# Patient Record
Sex: FEMALE | Race: BLACK OR AFRICAN AMERICAN | NOT HISPANIC OR LATINO | URBAN - METROPOLITAN AREA
[De-identification: names, ages, dates, MRNs, and addresses within clinical notes are randomized per-mention and may not be internally consistent; named-entity substitution may affect disease eponyms.]

---

## 2024-11-06 ENCOUNTER — APPOINTMENT (OUTPATIENT)
Dept: RADIOLOGY | Facility: HOSPITAL | Age: 64
End: 2024-11-06
Payer: COMMERCIAL

## 2024-11-06 ENCOUNTER — APPOINTMENT (OUTPATIENT)
Dept: CARDIOLOGY | Facility: HOSPITAL | Age: 64
End: 2024-11-06
Payer: COMMERCIAL

## 2024-11-06 ENCOUNTER — HOSPITAL ENCOUNTER (OUTPATIENT)
Facility: HOSPITAL | Age: 64
Setting detail: OBSERVATION
Discharge: HOME | End: 2024-11-08
Attending: STUDENT IN AN ORGANIZED HEALTH CARE EDUCATION/TRAINING PROGRAM | Admitting: INTERNAL MEDICINE
Payer: COMMERCIAL

## 2024-11-06 DIAGNOSIS — G45.9 TRANSIENT CEREBRAL ISCHEMIC ATTACK, UNSPECIFIED: ICD-10-CM

## 2024-11-06 DIAGNOSIS — R55 SYNCOPE AND COLLAPSE: ICD-10-CM

## 2024-11-06 DIAGNOSIS — H61.20 CERUMEN IN AUDITORY CANAL ON EXAMINATION: ICD-10-CM

## 2024-11-06 DIAGNOSIS — R42 LIGHTHEADEDNESS: Primary | ICD-10-CM

## 2024-11-06 LAB
ALBUMIN SERPL BCP-MCNC: 4 G/DL (ref 3.4–5)
ALP SERPL-CCNC: 81 U/L (ref 33–136)
ALT SERPL W P-5'-P-CCNC: 23 U/L (ref 7–45)
ANION GAP SERPL CALC-SCNC: 9 MMOL/L (ref 10–20)
APPEARANCE UR: CLEAR
AST SERPL W P-5'-P-CCNC: 30 U/L (ref 9–39)
BASOPHILS # BLD AUTO: 0.02 X10*3/UL (ref 0–0.1)
BASOPHILS NFR BLD AUTO: 0.3 %
BILIRUB SERPL-MCNC: 0.3 MG/DL (ref 0–1.2)
BILIRUB UR STRIP.AUTO-MCNC: NEGATIVE MG/DL
BNP SERPL-MCNC: 20 PG/ML (ref 0–99)
BUN SERPL-MCNC: 20 MG/DL (ref 6–23)
CALCIUM SERPL-MCNC: 9.1 MG/DL (ref 8.6–10.3)
CARDIAC TROPONIN I PNL SERPL HS: 4 NG/L (ref 0–13)
CHLORIDE SERPL-SCNC: 107 MMOL/L (ref 98–107)
CO2 SERPL-SCNC: 28 MMOL/L (ref 21–32)
COLOR UR: ABNORMAL
CREAT SERPL-MCNC: 0.65 MG/DL (ref 0.5–1.05)
EGFRCR SERPLBLD CKD-EPI 2021: >90 ML/MIN/1.73M*2
EOSINOPHIL # BLD AUTO: 0.18 X10*3/UL (ref 0–0.7)
EOSINOPHIL NFR BLD AUTO: 2.5 %
ERYTHROCYTE [DISTWIDTH] IN BLOOD BY AUTOMATED COUNT: 12.9 % (ref 11.5–14.5)
GLUCOSE SERPL-MCNC: 102 MG/DL (ref 74–99)
GLUCOSE UR STRIP.AUTO-MCNC: NORMAL MG/DL
HCT VFR BLD AUTO: 38.8 % (ref 36–46)
HGB BLD-MCNC: 12.7 G/DL (ref 12–16)
IMM GRANULOCYTES # BLD AUTO: 0.01 X10*3/UL (ref 0–0.7)
IMM GRANULOCYTES NFR BLD AUTO: 0.1 % (ref 0–0.9)
KETONES UR STRIP.AUTO-MCNC: NEGATIVE MG/DL
LEUKOCYTE ESTERASE UR QL STRIP.AUTO: NEGATIVE
LYMPHOCYTES # BLD AUTO: 2.63 X10*3/UL (ref 1.2–4.8)
LYMPHOCYTES NFR BLD AUTO: 36.3 %
MCH RBC QN AUTO: 29.3 PG (ref 26–34)
MCHC RBC AUTO-ENTMCNC: 32.7 G/DL (ref 32–36)
MCV RBC AUTO: 90 FL (ref 80–100)
MONOCYTES # BLD AUTO: 0.47 X10*3/UL (ref 0.1–1)
MONOCYTES NFR BLD AUTO: 6.5 %
MUCOUS THREADS #/AREA URNS AUTO: NORMAL /LPF
NEUTROPHILS # BLD AUTO: 3.94 X10*3/UL (ref 1.2–7.7)
NEUTROPHILS NFR BLD AUTO: 54.3 %
NITRITE UR QL STRIP.AUTO: NEGATIVE
NRBC BLD-RTO: 0 /100 WBCS (ref 0–0)
PH UR STRIP.AUTO: 6.5 [PH]
PLATELET # BLD AUTO: 295 X10*3/UL (ref 150–450)
POTASSIUM SERPL-SCNC: 4.5 MMOL/L (ref 3.5–5.3)
PROT SERPL-MCNC: 7.2 G/DL (ref 6.4–8.2)
PROT UR STRIP.AUTO-MCNC: ABNORMAL MG/DL
RBC # BLD AUTO: 4.33 X10*6/UL (ref 4–5.2)
RBC # UR STRIP.AUTO: NEGATIVE /UL
RBC #/AREA URNS AUTO: NORMAL /HPF
SARS-COV-2 RNA RESP QL NAA+PROBE: NOT DETECTED
SODIUM SERPL-SCNC: 139 MMOL/L (ref 136–145)
SP GR UR STRIP.AUTO: >1.05
SQUAMOUS #/AREA URNS AUTO: NORMAL /HPF
T4 FREE SERPL-MCNC: 0.82 NG/DL (ref 0.61–1.12)
TSH SERPL-ACNC: 5.09 MIU/L (ref 0.44–3.98)
UROBILINOGEN UR STRIP.AUTO-MCNC: NORMAL MG/DL
WBC # BLD AUTO: 7.3 X10*3/UL (ref 4.4–11.3)
WBC #/AREA URNS AUTO: NORMAL /HPF

## 2024-11-06 PROCEDURE — 85025 COMPLETE CBC W/AUTO DIFF WBC: CPT | Performed by: STUDENT IN AN ORGANIZED HEALTH CARE EDUCATION/TRAINING PROGRAM

## 2024-11-06 PROCEDURE — 2500000004 HC RX 250 GENERAL PHARMACY W/ HCPCS (ALT 636 FOR OP/ED): Performed by: STUDENT IN AN ORGANIZED HEALTH CARE EDUCATION/TRAINING PROGRAM

## 2024-11-06 PROCEDURE — 70498 CT ANGIOGRAPHY NECK: CPT | Performed by: STUDENT IN AN ORGANIZED HEALTH CARE EDUCATION/TRAINING PROGRAM

## 2024-11-06 PROCEDURE — 80053 COMPREHEN METABOLIC PANEL: CPT | Performed by: STUDENT IN AN ORGANIZED HEALTH CARE EDUCATION/TRAINING PROGRAM

## 2024-11-06 PROCEDURE — 83880 ASSAY OF NATRIURETIC PEPTIDE: CPT | Performed by: STUDENT IN AN ORGANIZED HEALTH CARE EDUCATION/TRAINING PROGRAM

## 2024-11-06 PROCEDURE — 70496 CT ANGIOGRAPHY HEAD: CPT | Performed by: STUDENT IN AN ORGANIZED HEALTH CARE EDUCATION/TRAINING PROGRAM

## 2024-11-06 PROCEDURE — 81001 URINALYSIS AUTO W/SCOPE: CPT | Performed by: STUDENT IN AN ORGANIZED HEALTH CARE EDUCATION/TRAINING PROGRAM

## 2024-11-06 PROCEDURE — 84484 ASSAY OF TROPONIN QUANT: CPT | Performed by: STUDENT IN AN ORGANIZED HEALTH CARE EDUCATION/TRAINING PROGRAM

## 2024-11-06 PROCEDURE — 99285 EMERGENCY DEPT VISIT HI MDM: CPT

## 2024-11-06 PROCEDURE — 87635 SARS-COV-2 COVID-19 AMP PRB: CPT | Performed by: STUDENT IN AN ORGANIZED HEALTH CARE EDUCATION/TRAINING PROGRAM

## 2024-11-06 PROCEDURE — 96374 THER/PROPH/DIAG INJ IV PUSH: CPT | Mod: 59

## 2024-11-06 PROCEDURE — 71045 X-RAY EXAM CHEST 1 VIEW: CPT

## 2024-11-06 PROCEDURE — 84443 ASSAY THYROID STIM HORMONE: CPT | Performed by: STUDENT IN AN ORGANIZED HEALTH CARE EDUCATION/TRAINING PROGRAM

## 2024-11-06 PROCEDURE — 36415 COLL VENOUS BLD VENIPUNCTURE: CPT | Performed by: STUDENT IN AN ORGANIZED HEALTH CARE EDUCATION/TRAINING PROGRAM

## 2024-11-06 PROCEDURE — 93005 ELECTROCARDIOGRAM TRACING: CPT

## 2024-11-06 PROCEDURE — 84439 ASSAY OF FREE THYROXINE: CPT | Performed by: STUDENT IN AN ORGANIZED HEALTH CARE EDUCATION/TRAINING PROGRAM

## 2024-11-06 PROCEDURE — 70498 CT ANGIOGRAPHY NECK: CPT

## 2024-11-06 PROCEDURE — 2550000001 HC RX 255 CONTRASTS: Performed by: STUDENT IN AN ORGANIZED HEALTH CARE EDUCATION/TRAINING PROGRAM

## 2024-11-06 PROCEDURE — 80061 LIPID PANEL: CPT | Performed by: NURSE PRACTITIONER

## 2024-11-06 PROCEDURE — 71045 X-RAY EXAM CHEST 1 VIEW: CPT | Performed by: RADIOLOGY

## 2024-11-06 PROCEDURE — 2500000001 HC RX 250 WO HCPCS SELF ADMINISTERED DRUGS (ALT 637 FOR MEDICARE OP): Performed by: STUDENT IN AN ORGANIZED HEALTH CARE EDUCATION/TRAINING PROGRAM

## 2024-11-06 PROCEDURE — 83036 HEMOGLOBIN GLYCOSYLATED A1C: CPT | Mod: AHULAB | Performed by: NURSE PRACTITIONER

## 2024-11-06 RX ORDER — KETOROLAC TROMETHAMINE 30 MG/ML
30 INJECTION, SOLUTION INTRAMUSCULAR; INTRAVENOUS ONCE
Status: COMPLETED | OUTPATIENT
Start: 2024-11-06 | End: 2024-11-06

## 2024-11-06 RX ORDER — OXYCODONE HYDROCHLORIDE 5 MG/1
5 TABLET ORAL ONCE
Status: COMPLETED | OUTPATIENT
Start: 2024-11-06 | End: 2024-11-06

## 2024-11-06 ASSESSMENT — PAIN SCALES - GENERAL
PAINLEVEL_OUTOF10: 9
PAINLEVEL_OUTOF10: 0 - NO PAIN
PAINLEVEL_OUTOF10: 8

## 2024-11-06 ASSESSMENT — COLUMBIA-SUICIDE SEVERITY RATING SCALE - C-SSRS
2. HAVE YOU ACTUALLY HAD ANY THOUGHTS OF KILLING YOURSELF?: NO
1. IN THE PAST MONTH, HAVE YOU WISHED YOU WERE DEAD OR WISHED YOU COULD GO TO SLEEP AND NOT WAKE UP?: NO
6. HAVE YOU EVER DONE ANYTHING, STARTED TO DO ANYTHING, OR PREPARED TO DO ANYTHING TO END YOUR LIFE?: NO

## 2024-11-06 ASSESSMENT — PAIN DESCRIPTION - LOCATION: LOCATION: HEAD

## 2024-11-06 ASSESSMENT — PAIN - FUNCTIONAL ASSESSMENT: PAIN_FUNCTIONAL_ASSESSMENT: 0-10

## 2024-11-07 ENCOUNTER — APPOINTMENT (OUTPATIENT)
Dept: VASCULAR MEDICINE | Facility: HOSPITAL | Age: 64
End: 2024-11-07
Payer: COMMERCIAL

## 2024-11-07 ENCOUNTER — APPOINTMENT (OUTPATIENT)
Dept: CARDIOLOGY | Facility: HOSPITAL | Age: 64
End: 2024-11-07
Payer: COMMERCIAL

## 2024-11-07 ENCOUNTER — APPOINTMENT (OUTPATIENT)
Dept: RADIOLOGY | Facility: HOSPITAL | Age: 64
End: 2024-11-07
Payer: COMMERCIAL

## 2024-11-07 PROBLEM — R42 LIGHTHEADEDNESS: Status: ACTIVE | Noted: 2024-11-07

## 2024-11-07 LAB
AORTIC VALVE MEAN GRADIENT: 5 MMHG
AORTIC VALVE PEAK VELOCITY: 1.55 M/S
ATRIAL RATE: 71 BPM
AV PEAK GRADIENT: 10 MMHG
AVA (PEAK VEL): 1.8 CM2
AVA (VTI): 1.91 CM2
CARDIAC TROPONIN I PNL SERPL HS: 5 NG/L (ref 0–13)
CHOLEST SERPL-MCNC: 181 MG/DL (ref 0–199)
CHOLESTEROL/HDL RATIO: 2.6
EJECTION FRACTION APICAL 4 CHAMBER: 52.7
EJECTION FRACTION: 61 %
EST. AVERAGE GLUCOSE BLD GHB EST-MCNC: 134 MG/DL
HBA1C MFR BLD: 6.3 %
HDLC SERPL-MCNC: 69.1 MG/DL
LDLC SERPL CALC-MCNC: 100 MG/DL
LEFT ATRIUM VOLUME AREA LENGTH INDEX BSA: 19 ML/M2
LEFT VENTRICLE INTERNAL DIMENSION DIASTOLE: 3.62 CM (ref 3.5–6)
LEFT VENTRICULAR OUTFLOW TRACT DIAMETER: 2.07 CM
MITRAL VALVE E/A RATIO: 0.76
NON HDL CHOLESTEROL: 112 MG/DL (ref 0–149)
P AXIS: 78 DEGREES
P OFFSET: 194 MS
P ONSET: 142 MS
PR INTERVAL: 160 MS
Q ONSET: 222 MS
QRS COUNT: 12 BEATS
QRS DURATION: 86 MS
QT INTERVAL: 404 MS
QTC CALCULATION(BAZETT): 439 MS
QTC FREDERICIA: 427 MS
R AXIS: 57 DEGREES
T AXIS: 58 DEGREES
T OFFSET: 424 MS
TRICUSPID ANNULAR PLANE SYSTOLIC EXCURSION: 2 CM
TRIGL SERPL-MCNC: 60 MG/DL (ref 0–149)
VENTRICULAR RATE: 71 BPM
VLDL: 12 MG/DL (ref 0–40)

## 2024-11-07 PROCEDURE — 2500000002 HC RX 250 W HCPCS SELF ADMINISTERED DRUGS (ALT 637 FOR MEDICARE OP, ALT 636 FOR OP/ED): Performed by: EMERGENCY MEDICINE

## 2024-11-07 PROCEDURE — 93306 TTE W/DOPPLER COMPLETE: CPT

## 2024-11-07 PROCEDURE — 99232 SBSQ HOSP IP/OBS MODERATE 35: CPT | Performed by: NURSE PRACTITIONER

## 2024-11-07 PROCEDURE — 70551 MRI BRAIN STEM W/O DYE: CPT

## 2024-11-07 PROCEDURE — 93880 EXTRACRANIAL BILAT STUDY: CPT

## 2024-11-07 PROCEDURE — 70551 MRI BRAIN STEM W/O DYE: CPT | Performed by: STUDENT IN AN ORGANIZED HEALTH CARE EDUCATION/TRAINING PROGRAM

## 2024-11-07 PROCEDURE — 93880 EXTRACRANIAL BILAT STUDY: CPT | Performed by: INTERNAL MEDICINE

## 2024-11-07 PROCEDURE — G0378 HOSPITAL OBSERVATION PER HR: HCPCS

## 2024-11-07 PROCEDURE — 2500000004 HC RX 250 GENERAL PHARMACY W/ HCPCS (ALT 636 FOR OP/ED): Performed by: NURSE PRACTITIONER

## 2024-11-07 PROCEDURE — 93306 TTE W/DOPPLER COMPLETE: CPT | Performed by: STUDENT IN AN ORGANIZED HEALTH CARE EDUCATION/TRAINING PROGRAM

## 2024-11-07 PROCEDURE — 2500000002 HC RX 250 W HCPCS SELF ADMINISTERED DRUGS (ALT 637 FOR MEDICARE OP, ALT 636 FOR OP/ED): Performed by: NURSE PRACTITIONER

## 2024-11-07 PROCEDURE — 99253 IP/OBS CNSLTJ NEW/EST LOW 45: CPT | Performed by: PSYCHIATRY & NEUROLOGY

## 2024-11-07 PROCEDURE — 2500000001 HC RX 250 WO HCPCS SELF ADMINISTERED DRUGS (ALT 637 FOR MEDICARE OP): Performed by: NURSE PRACTITIONER

## 2024-11-07 PROCEDURE — 96372 THER/PROPH/DIAG INJ SC/IM: CPT | Performed by: NURSE PRACTITIONER

## 2024-11-07 RX ORDER — AMLODIPINE BESYLATE 2.5 MG/1
2.5 TABLET ORAL DAILY
COMMUNITY

## 2024-11-07 RX ORDER — MECLIZINE HYDROCHLORIDE 25 MG/1
25 TABLET ORAL ONCE
Status: COMPLETED | OUTPATIENT
Start: 2024-11-07 | End: 2024-11-07

## 2024-11-07 RX ORDER — ACETAMINOPHEN 650 MG/1
650 SUPPOSITORY RECTAL EVERY 4 HOURS PRN
Status: DISCONTINUED | OUTPATIENT
Start: 2024-11-07 | End: 2024-11-08 | Stop reason: HOSPADM

## 2024-11-07 RX ORDER — ATORVASTATIN CALCIUM 40 MG/1
40 TABLET, FILM COATED ORAL NIGHTLY
Status: DISCONTINUED | OUTPATIENT
Start: 2024-11-07 | End: 2024-11-08 | Stop reason: HOSPADM

## 2024-11-07 RX ORDER — ASPIRIN 81 MG/1
81 TABLET ORAL DAILY
Status: DISCONTINUED | OUTPATIENT
Start: 2024-11-07 | End: 2024-11-08 | Stop reason: HOSPADM

## 2024-11-07 RX ORDER — ACETAMINOPHEN 325 MG/1
650 TABLET ORAL EVERY 4 HOURS PRN
Status: DISCONTINUED | OUTPATIENT
Start: 2024-11-07 | End: 2024-11-07

## 2024-11-07 RX ORDER — MECLIZINE HYDROCHLORIDE 25 MG/1
25 TABLET ORAL 3 TIMES DAILY PRN
Status: DISCONTINUED | OUTPATIENT
Start: 2024-11-07 | End: 2024-11-08 | Stop reason: HOSPADM

## 2024-11-07 RX ORDER — ONDANSETRON 4 MG/1
4 TABLET, FILM COATED ORAL EVERY 8 HOURS PRN
Status: DISCONTINUED | OUTPATIENT
Start: 2024-11-07 | End: 2024-11-08 | Stop reason: HOSPADM

## 2024-11-07 RX ORDER — ACETAMINOPHEN 160 MG/5ML
650 SOLUTION ORAL EVERY 4 HOURS PRN
Status: DISCONTINUED | OUTPATIENT
Start: 2024-11-07 | End: 2024-11-08 | Stop reason: HOSPADM

## 2024-11-07 RX ORDER — ONDANSETRON HYDROCHLORIDE 2 MG/ML
4 INJECTION, SOLUTION INTRAVENOUS EVERY 8 HOURS PRN
Status: DISCONTINUED | OUTPATIENT
Start: 2024-11-07 | End: 2024-11-08 | Stop reason: HOSPADM

## 2024-11-07 RX ORDER — ENOXAPARIN SODIUM 100 MG/ML
40 INJECTION SUBCUTANEOUS EVERY 24 HOURS
Status: DISCONTINUED | OUTPATIENT
Start: 2024-11-07 | End: 2024-11-08 | Stop reason: HOSPADM

## 2024-11-07 RX ORDER — AMLODIPINE BESYLATE 2.5 MG/1
2.5 TABLET ORAL DAILY
Status: DISCONTINUED | OUTPATIENT
Start: 2024-11-07 | End: 2024-11-08 | Stop reason: HOSPADM

## 2024-11-07 SDOH — SOCIAL STABILITY: SOCIAL INSECURITY: HAVE YOU HAD THOUGHTS OF HARMING ANYONE ELSE?: NO

## 2024-11-07 SDOH — ECONOMIC STABILITY: FOOD INSECURITY: WITHIN THE PAST 12 MONTHS, THE FOOD YOU BOUGHT JUST DIDN'T LAST AND YOU DIDN'T HAVE MONEY TO GET MORE.: NEVER TRUE

## 2024-11-07 SDOH — ECONOMIC STABILITY: FOOD INSECURITY: WITHIN THE PAST 12 MONTHS, YOU WORRIED THAT YOUR FOOD WOULD RUN OUT BEFORE YOU GOT THE MONEY TO BUY MORE.: NEVER TRUE

## 2024-11-07 SDOH — SOCIAL STABILITY: SOCIAL INSECURITY
WITHIN THE LAST YEAR, HAVE YOU BEEN KICKED, HIT, SLAPPED, OR OTHERWISE PHYSICALLY HURT BY YOUR PARTNER OR EX-PARTNER?: NO

## 2024-11-07 SDOH — SOCIAL STABILITY: SOCIAL INSECURITY: WITHIN THE LAST YEAR, HAVE YOU BEEN AFRAID OF YOUR PARTNER OR EX-PARTNER?: NO

## 2024-11-07 SDOH — SOCIAL STABILITY: SOCIAL INSECURITY
WITHIN THE LAST YEAR, HAVE YOU BEEN RAPED OR FORCED TO HAVE ANY KIND OF SEXUAL ACTIVITY BY YOUR PARTNER OR EX-PARTNER?: NO

## 2024-11-07 SDOH — SOCIAL STABILITY: SOCIAL INSECURITY: WITHIN THE LAST YEAR, HAVE YOU BEEN HUMILIATED OR EMOTIONALLY ABUSED IN OTHER WAYS BY YOUR PARTNER OR EX-PARTNER?: NO

## 2024-11-07 SDOH — ECONOMIC STABILITY: INCOME INSECURITY: IN THE PAST 12 MONTHS HAS THE ELECTRIC, GAS, OIL, OR WATER COMPANY THREATENED TO SHUT OFF SERVICES IN YOUR HOME?: NO

## 2024-11-07 ASSESSMENT — COGNITIVE AND FUNCTIONAL STATUS - GENERAL
MOBILITY SCORE: 23
DAILY ACTIVITIY SCORE: 24
DAILY ACTIVITIY SCORE: 24
MOBILITY SCORE: 24
CLIMB 3 TO 5 STEPS WITH RAILING: A LITTLE
DAILY ACTIVITIY SCORE: 24
PATIENT BASELINE BEDBOUND: NO
MOBILITY SCORE: 24

## 2024-11-07 ASSESSMENT — ACTIVITIES OF DAILY LIVING (ADL)
TOILETING: INDEPENDENT
PATIENT'S MEMORY ADEQUATE TO SAFELY COMPLETE DAILY ACTIVITIES?: YES
LACK_OF_TRANSPORTATION: NO
BATHING: INDEPENDENT
LACK_OF_TRANSPORTATION: NO
LACK_OF_TRANSPORTATION: NO
DRESSING YOURSELF: INDEPENDENT
ADEQUATE_TO_COMPLETE_ADL: YES
GROOMING: INDEPENDENT
HEARING - RIGHT EAR: FUNCTIONAL
JUDGMENT_ADEQUATE_SAFELY_COMPLETE_DAILY_ACTIVITIES: YES
WALKS IN HOME: INDEPENDENT
FEEDING YOURSELF: INDEPENDENT
HEARING - LEFT EAR: FUNCTIONAL

## 2024-11-07 ASSESSMENT — LIFESTYLE VARIABLES
AUDIT-C TOTAL SCORE: 0
HOW OFTEN DO YOU HAVE A DRINK CONTAINING ALCOHOL: NEVER
SKIP TO QUESTIONS 9-10: 1
AUDIT-C TOTAL SCORE: 0
HOW OFTEN DO YOU HAVE 6 OR MORE DRINKS ON ONE OCCASION: NEVER
HOW MANY STANDARD DRINKS CONTAINING ALCOHOL DO YOU HAVE ON A TYPICAL DAY: PATIENT DOES NOT DRINK

## 2024-11-07 ASSESSMENT — PATIENT HEALTH QUESTIONNAIRE - PHQ9
SUM OF ALL RESPONSES TO PHQ9 QUESTIONS 1 & 2: 0
1. LITTLE INTEREST OR PLEASURE IN DOING THINGS: NOT AT ALL
2. FEELING DOWN, DEPRESSED OR HOPELESS: NOT AT ALL

## 2024-11-07 ASSESSMENT — PAIN SCALES - GENERAL
PAINLEVEL_OUTOF10: 4
PAINLEVEL_OUTOF10: 0 - NO PAIN
PAINLEVEL_OUTOF10: 0 - NO PAIN

## 2024-11-07 ASSESSMENT — ENCOUNTER SYMPTOMS
DIZZINESS: 1
NUMBNESS: 1

## 2024-11-07 NOTE — PROGRESS NOTES
11/07/24 0643   [facility] Disability Status   Are you deaf or do you have serious difficulty hearing? N   Are you blind or do you have serious difficulty seeing, even when wearing glasses? N   Because of a physical, mental, or emotional condition, do you have serious difficulty concentrating, remembering, or making decisions? (5 years old or older) N   Do you have serious difficulty walking or climbing stairs? N   Do you have serious difficulty dressing or bathing? N   Because of a physical, mental, or emotional condition, do you have serious difficulty doing errands alone such as visiting the doctor? N

## 2024-11-07 NOTE — ED TRIAGE NOTES
BIBPV c/o dizziness,sob, weakness, numbness and can't fully extend right arm. Pt stated that it's been happening for a week. Pt denies cp.

## 2024-11-07 NOTE — HOSPITAL COURSE
Presenting for lightheadedness and RUE parasthesias that have been ongoing for about 2 weeks.       PMHX: HTN, asthma,

## 2024-11-07 NOTE — H&P
History Of Present Illness  Nancy Ramírez is a 63 y.o. female presenting with lightheadedness and RUE paraesthesias that have been ongoing for about 2 weeks. She best describes the lightheadedness and dizziness as feeling like the room is spinning. She endorses these symptoms worsen when she stands up and moves around, also with bending over. She reports these symptoms have been intermittent over the past 2 weeks. She also endorses OCHOA, especially with walking up stairs and walking distances to where she has to stop and catch her breath. She endorses the RUE paresthesias as a throbbing numbness that started along the same time as the dizziness.      PMHX: HTN, asthma,     Past Medical History  No past medical history on file.    Surgical History  No past surgical history on file.     Social History  She has no history on file for tobacco use, alcohol use, and drug use.    Family History  No family history on file.     Allergies  Patient has no known allergies.    Review of Systems   Musculoskeletal:  Positive for gait problem.   Neurological:  Positive for dizziness and numbness.   All other systems reviewed and are negative.       Physical Exam  Constitutional:       Appearance: Normal appearance.   Cardiovascular:      Rate and Rhythm: Normal rate and regular rhythm.      Pulses: Normal pulses.      Heart sounds: Normal heart sounds. No murmur heard.     No gallop.   Pulmonary:      Effort: Pulmonary effort is normal. No respiratory distress.      Breath sounds: Normal breath sounds. No wheezing or rhonchi.   Abdominal:      General: Abdomen is flat. Bowel sounds are normal. There is no distension.      Palpations: Abdomen is soft.      Tenderness: There is no abdominal tenderness. There is no guarding.   Skin:     General: Skin is warm.      Capillary Refill: Capillary refill takes less than 2 seconds.   Neurological:      Mental Status: She is alert and oriented to person, place, and time.      Cranial Nerves:  "No cranial nerve deficit or dysarthria.      Sensory: Sensory deficit present.      Motor: Weakness and pronator drift present.      Coordination: Finger-Nose-Finger Test abnormal and Heel to Ramos Test abnormal.      Comments: Difficulty with RUE finger to nose. Right sided paraesthesias.    RLE weakness with pedal push/pull. Drift with leg raise, unable to hold up    Psychiatric:         Mood and Affect: Mood normal.         Thought Content: Thought content normal.         Judgment: Judgment normal.          Last Recorded Vitals  Blood pressure 133/68, pulse 57, temperature 35.9 °C (96.6 °F), temperature source Temporal, resp. rate 17, height 1.575 m (5' 2\"), weight 65.8 kg (145 lb), SpO2 99%.    Relevant Results  Scheduled medications  [Held by provider] amLODIPine, 2.5 mg, oral, Daily  aspirin, 81 mg, oral, Daily  atorvastatin, 40 mg, oral, Nightly  enoxaparin, 40 mg, subcutaneous, q24h  perflutren lipid microspheres, 0.5-10 mL of dilution, intravenous, Once in imaging  perflutren protein A microsphere, 0.5 mL, intravenous, Once in imaging  sulfur hexafluoride microsphr, 2 mL, intravenous, Once in imaging      Continuous medications     PRN medications  PRN medications: [DISCONTINUED] acetaminophen **OR** acetaminophen **OR** acetaminophen, [Held by provider] meclizine, ondansetron **OR** ondansetron    Results for orders placed or performed during the hospital encounter of 11/06/24 (from the past 24 hours)   ECG 12 lead   Result Value Ref Range    Ventricular Rate 71 BPM    Atrial Rate 71 BPM    DE Interval 160 ms    QRS Duration 86 ms    QT Interval 404 ms    QTC Calculation(Bazett) 439 ms    P Axis 78 degrees    R Axis 57 degrees    T Axis 58 degrees    QRS Count 12 beats    Q Onset 222 ms    P Onset 142 ms    P Offset 194 ms    T Offset 424 ms    QTC Fredericia 427 ms   CBC and Auto Differential   Result Value Ref Range    WBC 7.3 4.4 - 11.3 x10*3/uL    nRBC 0.0 0.0 - 0.0 /100 WBCs    RBC 4.33 4.00 - 5.20 " x10*6/uL    Hemoglobin 12.7 12.0 - 16.0 g/dL    Hematocrit 38.8 36.0 - 46.0 %    MCV 90 80 - 100 fL    MCH 29.3 26.0 - 34.0 pg    MCHC 32.7 32.0 - 36.0 g/dL    RDW 12.9 11.5 - 14.5 %    Platelets 295 150 - 450 x10*3/uL    Neutrophils % 54.3 40.0 - 80.0 %    Immature Granulocytes %, Automated 0.1 0.0 - 0.9 %    Lymphocytes % 36.3 13.0 - 44.0 %    Monocytes % 6.5 2.0 - 10.0 %    Eosinophils % 2.5 0.0 - 6.0 %    Basophils % 0.3 0.0 - 2.0 %    Neutrophils Absolute 3.94 1.20 - 7.70 x10*3/uL    Immature Granulocytes Absolute, Automated 0.01 0.00 - 0.70 x10*3/uL    Lymphocytes Absolute 2.63 1.20 - 4.80 x10*3/uL    Monocytes Absolute 0.47 0.10 - 1.00 x10*3/uL    Eosinophils Absolute 0.18 0.00 - 0.70 x10*3/uL    Basophils Absolute 0.02 0.00 - 0.10 x10*3/uL   Comprehensive metabolic panel   Result Value Ref Range    Glucose 102 (H) 74 - 99 mg/dL    Sodium 139 136 - 145 mmol/L    Potassium 4.5 3.5 - 5.3 mmol/L    Chloride 107 98 - 107 mmol/L    Bicarbonate 28 21 - 32 mmol/L    Anion Gap 9 (L) 10 - 20 mmol/L    Urea Nitrogen 20 6 - 23 mg/dL    Creatinine 0.65 0.50 - 1.05 mg/dL    eGFR >90 >60 mL/min/1.73m*2    Calcium 9.1 8.6 - 10.3 mg/dL    Albumin 4.0 3.4 - 5.0 g/dL    Alkaline Phosphatase 81 33 - 136 U/L    Total Protein 7.2 6.4 - 8.2 g/dL    AST 30 9 - 39 U/L    Bilirubin, Total 0.3 0.0 - 1.2 mg/dL    ALT 23 7 - 45 U/L   TSH with reflex to Free T4 if abnormal   Result Value Ref Range    Thyroid Stimulating Hormone 5.09 (H) 0.44 - 3.98 mIU/L   B-type natriuretic peptide   Result Value Ref Range    BNP 20 0 - 99 pg/mL   Troponin I, High Sensitivity, Initial   Result Value Ref Range    Troponin I, High Sensitivity 4 0 - 13 ng/L   Thyroxine, Free   Result Value Ref Range    Thyroxine, Free 0.82 0.61 - 1.12 ng/dL   Sars-CoV-2 PCR   Result Value Ref Range    Coronavirus 2019, PCR Not Detected Not Detected   Troponin, High Sensitivity, 1 Hour   Result Value Ref Range    Troponin I, High Sensitivity 5 0 - 13 ng/L   Urinalysis with  Reflex Culture and Microscopic   Result Value Ref Range    Color, Urine Light-Yellow Light-Yellow, Yellow, Dark-Yellow    Appearance, Urine Clear Clear    Specific Gravity, Urine >1.050 (N) 1.005 - 1.035    pH, Urine 6.5 5.0, 5.5, 6.0, 6.5, 7.0, 7.5, 8.0    Protein, Urine 20 (TRACE) NEGATIVE, 10 (TRACE), 20 (TRACE) mg/dL    Glucose, Urine Normal Normal mg/dL    Blood, Urine NEGATIVE NEGATIVE    Ketones, Urine NEGATIVE NEGATIVE mg/dL    Bilirubin, Urine NEGATIVE NEGATIVE    Urobilinogen, Urine Normal Normal mg/dL    Nitrite, Urine NEGATIVE NEGATIVE    Leukocyte Esterase, Urine NEGATIVE NEGATIVE   Urinalysis Microscopic   Result Value Ref Range    WBC, Urine 1-5 1-5, NONE /HPF    RBC, Urine 1-2 NONE, 1-2, 3-5 /HPF    Squamous Epithelial Cells, Urine 1-9 (SPARSE) Reference range not established. /HPF    Mucus, Urine 2+ Reference range not established. /LPF     ECG 12 lead    Result Date: 11/7/2024  Normal sinus rhythm Normal ECG When compared with ECG of 08-OCT-2017 16:42, No significant change was found    CT angio head and neck w and wo IV contrast    Result Date: 11/7/2024  Interpreted By:  Stanley eCe, STUDY: CT ANGIO HEAD AND NECK W AND WO IV CONTRAST;  11/6/2024 11:10 pm   INDICATION: Signs/Symptoms:Lightheadedness v dizziness, RUE numbness subjective x 1 week.     COMPARISON: CT head dated 01/23/2020.   ACCESSION NUMBER(S): [id number]   ORDERING CLINICIAN: NIRMALA POLANCO   TECHNIQUE: Unenhanced CT images of the head were obtained. Subsequently,  75 mL of Omnipaque 350 was administered intravenously and axial images of the head and neck were acquired.  Coronal, sagittal, and 3-D reconstructions were provided for review.   FINDINGS: Noncontrast axial CT images of the head do not demonstrate any evidence of hyperdense intracranial hemorrhage. There is no evidence of mass effect or midline shift.   Gray-white differentiation is intact, without evidence of CT apparent transcortical infarct. Patchy  attenuation changes are present in the periventricular and subcortical white matter of bilateral cerebral hemispheres,   No ventricular dilatation is present. Basal cisterns are patent. No extra-axial fluid collections are identified.   Scalp soft tissues do not demonstrate any acute abnormality. No acute calvarial abnormality is present. Mastoid air cells and middle ear cavities are clear.   There is opacification of the right maxillary sinus with some bony hyperostosis of the sinus walls. Remaining sinuses are clear.   CTA HEAD FINDINGS:   Intracranial carotid arteries demonstrate symmetric opacification bilaterally without evidence of occlusion or high-grade stenosis. Carotid terminals are symmetric in appearance.   The anterior cerebral arteries demonstrate symmetric opacification proximally without evidence of occlusion.   Middle cerebral arteries are symmetric in appearance without evidence of high-grade stenosis in the M1 segments or focal vessel occlusion in the more distal cortical branches.   Visualized intracranial vertebral arteries demonstrate symmetric opacification, without evidence of occlusion. There is anatomic variant dominant left vertebral artery.   No occlusion or stenosis is identified in the basilar artery.   The left posterior cerebral artery is supplied predominantly by the left posterior communicating artery with a hypoplastic/aplastic left P1. Otherwise more distally the posterior cerebral arteries demonstrate symmetric opacification without evidence of occlusion.   No enhancing masses or vascular malformations are identified.   CTA NECK FINDINGS:   Evaluation of the upper chest and lower neck is somewhat degraded by beam hardening artifact from the contrast bolus in the left subclavian vein.   Within limitations of the study there is a three-vessel aortic arch, without significant atherosclerotic changes to the arch of the origin of the great vessels.   Common carotid arteries  demonstrate symmetric opacification bilaterally without evidence of occlusion.   Calcified noncalcified atherosclerotic plaques are present in the proximal extracranial internal carotid artery, just distal to the carotid bifurcation without significant stenosis by NASCET criteria. No occlusion is identified in the carotid arteries arteries more distally.   Extracranial vertebral arteries originate from the subclavian arteries bilaterally and demonstrate symmetric opacification without evidence of occlusion or high-grade stenosis.   Soft tissues of the face and skull base do not demonstrate any acute abnormality. Thyroid is unremarkable in appearance.   Paraseptal and centrilobular emphysematous changes are present in the upper lungs bilaterally.   Multilevel degenerative changes are present in the cervical spine with intervertebral disc height loss, and likely mild spinal canal narrowing at several levels due to disc osteophyte complexes and hypertrophic facet changes.       1.  No large vessel occlusion or high-grade stenosis is identified in the proximal intracranial circulation. 2. Some atherosclerotic changes are present at the left carotid bifurcation, without significant stenosis by NASCET criteria. No significant stenosis or occlusion is present in the other large arteries of the neck 3. Paraseptal and centrilobular emphysematous changes are present in the included lung apices bilaterally.   MACRO: None   Signed by: Stanley Cee 11/7/2024 12:01 AM Dictation workstation:   [id number]    XR chest 1 view    Result Date: 11/6/2024  Interpreted By:  Kasey Smith, STUDY: XR CHEST 1 VIEW;  11/6/2024 8:55 pm   INDICATION: Signs/Symptoms:dyspnea on exertion.   COMPARISON: Chest x-ray 10/08/2017.   ACCESSION NUMBER(S): [id number]   ORDERING CLINICIAN: NIRMALA POLANCO   FINDINGS: Multiple overlying leads are present.   CARDIOMEDIASTINAL SILHOUETTE: Cardiomediastinal silhouette is normal in size and  configuration.   LUNGS: No consolidation, pleural effusion or pneumothorax.   ABDOMEN: Surgical clips noted in the right upper quadrant.   BONES: No acute osseous abnormality.       No acute cardiopulmonary process.   MACRO: None   Signed by: Kasey Smith 11/6/2024 9:13 PM Dictation workstation:   [id number]        Assessment/Plan   Assessment & Plan  Lightheadedness    Nancy Ramírez is a 63 y.o. female presenting with lightheadedness and RUE paraesthesias that have been ongoing for about 2 weeks. She best describes the lightheadedness and dizziness as feeling like the room is spinning. She endorses these symptoms worsen when she stands up and moves around, also with bending over. She reports these symptoms have been intermittent over the past 2 weeks. She also endorses OCHOA, especially with walking up stairs and walking distances to where she has to stop and catch her breath. She endorses the RUE paresthesias as a throbbing numbness that started along the same time as the dizziness.      PMHX: HTN, asthma,    Dizziness/ right sided deficits   -CTA head and neck neg  -Q4 neuro checks  -PE concerning for neuro process with right sided deficits-> unclear exact time the right sided deficits started, patient unsure her other neuro symptoms have been ongoing for 2 weeks. Cannot confidently say last known well. CT head already done-> continue with MRI   -MRI   -add statin and aspirin  -tele  -echo with bubble study  -add lipid panel/ HA1C  -neuro consulted, appreciate recs  -unclear on last known well, will hold amlodipine for now.   -orthos neg     SOB/ OCHOA  -asthma related?  -CXR neg  -echo ordered (part of stroke work up)    HTN  -unclear on last known well   -only on low dose amlodipine. Hold for now to allow for permissive HTN for atleast 24 hrs    DVT prophylaxis:  Lovenox, SCD    Labs/Testing reviewed    Interdisciplinary team rounding completed with hospitalist, nurse, TCC    NP discussed plan and lab/testing  results with Dr. Saenz       I spent 45 minutes in the professional and overall care of this patient.      Venus Bender, APRN-CNP

## 2024-11-07 NOTE — PROGRESS NOTES
Transitional Care Coordination Progress Note:  Plan per Medical/Surgical team: treatment of lightheadedness & dizziness with antivert   Status: Observation  Payor source: [place] medicaid  Discharge disposition: Home alone   Potential Barriers: MRI pending   ADOD: 11/8/2024  ROSA Montano RN, BSN Transitional Care Coordinator ED# [phone]      11/07/24 0643   Discharge Planning   Living Arrangements Alone   Support Systems Family members   Assistance Needed MRI brain pending   Type of Residence Private residence   Number of Stairs to Enter Residence 6   Number of Stairs Within Residence 15   Home or Post Acute Services None   Expected Discharge Disposition Home   Does the patient need discharge transport arranged? No   Financial Resource Strain   How hard is it for you to pay for the very basics like food, housing, medical care, and heating? Not hard   Housing Stability   In the last 12 months, was there a time when you were not able to pay the mortgage or rent on time? N   In the past 12 months, how many times have you moved where you were living? 1   At any time in the past 12 months, were you homeless or living in a shelter (including now)? N   Transportation Needs   In the past 12 months, has lack of transportation kept you from medical appointments or from getting medications? no   In the past 12 months, has lack of transportation kept you from meetings, work, or from getting things needed for daily living? No

## 2024-11-07 NOTE — CONSULTS
Inpatient consult to Neurology  Consult performed by: Navi Maciel MD  Consult ordered by: MG Talley-CNP          History Of Present Illness  Nancy Ramírez is a 63 y.o. female presenting with lightheadedness.    She has past medical history significant for hypertension, asthma.    She presented to the [facility] ED yesterday with 1-2 weeks history of postural lightheadedness.  She endorses feeling somewhat lightheaded when she sits up in bed first thing in the morning, and this is exacerbated if she bends down to put on socks and then sits back up or stands up.  It has not reached the point of syncope or strong near syncope (such as graying of vision).    She has additionally noted over the same period of time that when she is at work reaching overhead with her right arm, which is required in the course of her work, this exacerbates her lightheadedness.  Curiously she does not note this phenomenon when she is reaching overhead with her left arm, only the right.  She denies lightheadedness/dizziness exacerbated by head turn to either side.    She denies spinning vertigo and characterizes her dizziness strictly as lightheadedness.    She denies neck pain.    She additionally endorses vague sensory disturbance involving the right arm and/or leg recently.  She does not specifically endorse numbness, tingling or pain in the feet per se.    She indicates a low level headache over the past 1-2 weeks which is symmetric in the frontal regions.  She denies a past history of migraine.  She does endorse some photophobia recently.    She endorses low appetite recently with reduced oral intake.  She acknowledges that she may not be hydrating well enough.  She cannot say however whether drinking fluids improves her symptoms.    I reviewed CT angiogram of head and neck which shows no large vessel occlusion or high-grade stenosis of major vessels.  Moderate multilevel cervical spondylosis with multilevel loss of disc  height.      Past Medical History  No past medical history on file.  Surgical History  No past surgical history on file.  Social History  Social History     Tobacco Use    Smoking status: Unknown     Allergies  Patient has no known allergies.  Medications Prior to Admission   Medication Sig Dispense Refill Last Dose/Taking    amLODIPine (Norvasc) 2.5 mg tablet Take 1 tablet (2.5 mg) by mouth once daily.          Review of Systems    Review of Systems:  Neurologic:  As per the history of present illness.  Constitutional:  Negative for fevers, positive for reduced appetite and reduced oral intake.  Musculoskeletal:  Negative for neck pain. Cardiovascular:  Negative for chest pain or palpitations.  Respiratory:  Negative for dyspnea.  Eyes:  Negative for acute vision loss.  ENT:  Negative for acute hearing loss.  GI:  Negative for vomiting.           Neurological Exam  Physical Exam    Physical Examination:    General: Alert, sitting up in bed in no acute distress.    Mental Status: Clear sensorium without fluctuation.  Appropriate in conversation.  Fluent unremarkable speech without paraphasic errors.  Oriented to self, place, date.    Cranial Nerves:  Pupils were equal, round and reactive to light with no relative afferent pupillary defect.  Extraocular movements were intact and conjugate without nystagmus.  No ptosis.  Visual fields were full to confrontation tested binocularly.  Facial motor function was symmetrically intact.  Hearing was grossly intact.  No dysarthria.  Shoulder shrug was symmetric.  Tongue protrusion was midline.    Motor: There was no pronator drift or asymmetry of finger taps. Confrontation strength testing was characterized by asthenic effort throughout but with encouragement she gave roughly 4/5 strength symmetrically in the upper and lower extremities.    Coordination: Finger to finger was accurate bilaterally without dysmetria or intention tremor.  No postural or rest tremor, myoclonus or  "dystonic posturing.    Tendon Reflexes: Symmetrically absent biceps, brachioradialis and patellar, extremely hypersensitive to plantar stimulation rendering plantar assessment difficult.      Last Recorded Vitals  Blood pressure 139/78, pulse 75, temperature 36.4 °C (97.5 °F), temperature source Temporal, resp. rate 18, height 1.575 m (5' 2\"), weight 65.8 kg (145 lb), SpO2 98%.    Relevant Results                    [place] Coma Scale  Best Eye Response: Spontaneous  Best Verbal Response: Oriented  Best Motor Response: Follows commands  [place] Coma Scale Score: 15                 I have personally reviewed the following imaging results ECG 12 lead    Result Date: 11/7/2024  Normal sinus rhythm Normal ECG When compared with ECG of 08-OCT-2017 16:42, No significant change was found    CT angio head and neck w and wo IV contrast    Result Date: 11/7/2024  Interpreted By:  Stanley Cee, STUDY: CT ANGIO HEAD AND NECK W AND WO IV CONTRAST;  11/6/2024 11:10 pm   INDICATION: Signs/Symptoms:Lightheadedness v dizziness, RUE numbness subjective x 1 week.     COMPARISON: CT head dated 01/23/2020.   ACCESSION NUMBER(S): [id number]   ORDERING CLINICIAN: NIRMALA POLANCO   TECHNIQUE: Unenhanced CT images of the head were obtained. Subsequently,  75 mL of Omnipaque 350 was administered intravenously and axial images of the head and neck were acquired.  Coronal, sagittal, and 3-D reconstructions were provided for review.   FINDINGS: Noncontrast axial CT images of the head do not demonstrate any evidence of hyperdense intracranial hemorrhage. There is no evidence of mass effect or midline shift.   Gray-white differentiation is intact, without evidence of CT apparent transcortical infarct. Patchy attenuation changes are present in the periventricular and subcortical white matter of bilateral cerebral hemispheres,   No ventricular dilatation is present. Basal cisterns are patent. No extra-axial fluid collections are " identified.   Scalp soft tissues do not demonstrate any acute abnormality. No acute calvarial abnormality is present. Mastoid air cells and middle ear cavities are clear.   There is opacification of the right maxillary sinus with some bony hyperostosis of the sinus walls. Remaining sinuses are clear.   CTA HEAD FINDINGS:   Intracranial carotid arteries demonstrate symmetric opacification bilaterally without evidence of occlusion or high-grade stenosis. Carotid terminals are symmetric in appearance.   The anterior cerebral arteries demonstrate symmetric opacification proximally without evidence of occlusion.   Middle cerebral arteries are symmetric in appearance without evidence of high-grade stenosis in the M1 segments or focal vessel occlusion in the more distal cortical branches.   Visualized intracranial vertebral arteries demonstrate symmetric opacification, without evidence of occlusion. There is anatomic variant dominant left vertebral artery.   No occlusion or stenosis is identified in the basilar artery.   The left posterior cerebral artery is supplied predominantly by the left posterior communicating artery with a hypoplastic/aplastic left P1. Otherwise more distally the posterior cerebral arteries demonstrate symmetric opacification without evidence of occlusion.   No enhancing masses or vascular malformations are identified.   CTA NECK FINDINGS:   Evaluation of the upper chest and lower neck is somewhat degraded by beam hardening artifact from the contrast bolus in the left subclavian vein.   Within limitations of the study there is a three-vessel aortic arch, without significant atherosclerotic changes to the arch of the origin of the great vessels.   Common carotid arteries demonstrate symmetric opacification bilaterally without evidence of occlusion.   Calcified noncalcified atherosclerotic plaques are present in the proximal extracranial internal carotid artery, just distal to the carotid bifurcation  without significant stenosis by NASCET criteria. No occlusion is identified in the carotid arteries arteries more distally.   Extracranial vertebral arteries originate from the subclavian arteries bilaterally and demonstrate symmetric opacification without evidence of occlusion or high-grade stenosis.   Soft tissues of the face and skull base do not demonstrate any acute abnormality. Thyroid is unremarkable in appearance.   Paraseptal and centrilobular emphysematous changes are present in the upper lungs bilaterally.   Multilevel degenerative changes are present in the cervical spine with intervertebral disc height loss, and likely mild spinal canal narrowing at several levels due to disc osteophyte complexes and hypertrophic facet changes.       1.  No large vessel occlusion or high-grade stenosis is identified in the proximal intracranial circulation. 2. Some atherosclerotic changes are present at the left carotid bifurcation, without significant stenosis by NASCET criteria. No significant stenosis or occlusion is present in the other large arteries of the neck 3. Paraseptal and centrilobular emphysematous changes are present in the included lung apices bilaterally.   MACRO: None   Signed by: Stanley Cee 11/7/2024 12:01 AM Dictation workstation:   [id number]       Assessment/Plan   Assessment & Plan  Lightheadedness    This woman endorses postural lightheadedness over the past 1-2 weeks which for unclear reasons it is exacerbated when she is working with the right arm overhead.    Given the peculiar history, would recommend carotid ultrasound to evaluate for steal pathophysiology.    I reviewed with her the importance of vigorous oral hydration.    Would check orthostatic vital signs if not already done; it is very difficult to determine from Epic whether or not any of the recorded vitals are part of an orthostatic evaluation.     If symptoms persist and cannot be adequately addressed with blood  pressure medication adjustments, consider outpatient autonomic testing including tilt table.          Navi Maciel MD

## 2024-11-07 NOTE — PROGRESS NOTES
Emergency Department Transition of Care Note       Signout   I received Nancy MELO Darrell in signout from Dr. Sahni.  Please see the ED Provider Note for all HPI, PE and MDM up to the time of signout.  This is in addition to the primary record.    Briefly, this is a 63-year-old female presenting to the emergency department for lightheadedness for the past week, right arm paresthesia and pain. Evaluation by the prior provider revealed NIHSS of 0 and no signs to suggest posterior stroke. However, given her symptoms, a CT head and CTA were ordered, pending at signout. She received analgesics.     ED Course & Medical Decision Making     Her labs are notable for slightly elevated TSH with a normal free T4, otherwise unremarkable.  UA shows no signs of infection. CT/CTA shows no LVO.  There are some atherosclerotic changes in the left carotid without significant stenosis. See ED course below for further information.     She does report some ear fullness and has cerumen in both EACs. I removed some of this though there is still a decent amount present. There is no fluid or obvious debris otherwise to suggest otitis externa but discussed potentially considering topical antibiotics if symptoms worsen. Initially expressed desire to go home but discussed ambulation trial first as she still reports mild symptoms at rest.     She was given meclizine for further symptomatic management.     ED Course:  ED Course as of 11/09/24 1621   Thu Nov 07, 2024   0019 EKG, interpreted by me: normal sinus rhythm, 71 bpm. Normal axis. No acute ST elevation or depression. No acute T wave abnormalities. . No evidence of STEMI. [JG]   0234 On ambulation attempt, the patient became very lightheaded and unsteady on her feet.  She felt as though she was going to fall over.  Her symptoms subsided when resting. She has no recurrence of focal unilateral symptoms.  No truncal ataxia while sitting and no dysmetria present to indicate clear central  cause. However, given her overall presenting symptoms, this is not excluded and she may benefit from MRI as well as echo. I engaged in shared decision making with the patient and her daughter and will admit for further management.   [LM]   0238 XR chest 1 view [LM]      ED Course User Index  [JG] Gloria Sahni MD  [LM] Nieves Cordoba MD         Diagnoses as of 11/09/24 1621   Lightheadedness   Cerumen in auditory canal on examination       Disposition   Admission    Nieves Cordoba MD  Emergency Medicine

## 2024-11-07 NOTE — ED PROVIDER NOTES
"HPI   Chief Complaint   Patient presents with    Dizziness    Shortness of Breath       63-year-old female with history of hypertension reports that she has been having lightheadedness for the past week along with an odd sensation in her right upper extremity.  She states that every time she lifts her arms above her head to get something from cabinet, she becomes increasingly lightheaded.  She states she has had hot and cold flashes for the past week.  She initially describes her symptoms as dizziness, however denies any sensation of the room spinning around her.  She denies similar symptoms in the past.  She states she is having an odd sensation in her right upper extremity, describes it as a \"fuzziness\" and odd tingling, but denies numbness or weakness.  She is on antihypertensives, denies taking any additional doses of this medication, denies any recent dosage changes.  She states she has not been eating or drinking well due to general lack of appetite.  When asked about dysuria, patient states she is having \"something like that\", she denies vaginal discharge, hematuria, or concern for STI at this time but is unable to further characterize her urinary symptoms.  She denies changes in vision, blurred vision, numbness, weakness, neck or back pain, vomiting, diarrhea, dysuria, hematuria, flank pain, vaginal bleeding, vaginal discharge.              Patient History   No past medical history on file.  No past surgical history on file.  No family history on file.  Social History     Tobacco Use    Smoking status: Unknown    Smokeless tobacco: Not on file   Substance Use Topics    Alcohol use: Not on file    Drug use: Not on file       Physical Exam   ED Triage Vitals [11/06/24 1910]   Temperature Heart Rate Respirations BP   36.8 °C (98.2 °F) 74 18 149/84      Pulse Ox Temp Source Heart Rate Source Patient Position   99 % Temporal Monitor Sitting      BP Location FiO2 (%)     Right arm --       Physical Exam  Vitals " reviewed.   Eyes:      Extraocular Movements: Extraocular movements intact.      Pupils: Pupils are equal, round, and reactive to light.   Cardiovascular:      Rate and Rhythm: Normal rate and regular rhythm.   Pulmonary:      Effort: Pulmonary effort is normal.      Breath sounds: Normal breath sounds. No decreased breath sounds, wheezing, rhonchi or rales.   Abdominal:      Palpations: Abdomen is soft.      Tenderness: There is no abdominal tenderness. There is no guarding or rebound.   Musculoskeletal:         General: Normal range of motion.      Cervical back: Normal range of motion and neck supple.   Skin:     General: Skin is warm and dry.      Capillary Refill: Capillary refill takes less than 2 seconds.   Neurological:      General: No focal deficit present.      Mental Status: She is alert and oriented to person, place, and time.      Comments: [facility] 0           ED Course & [facility]   ED Course as of 11/10/24 0427   u Nov 07, 2024   0019 EKG, interpreted by me: normal sinus rhythm, 71 bpm. Normal axis. No acute ST elevation or depression. No acute T wave abnormalities. . No evidence of STEMI. [JG]   0234 On ambulation attempt, the patient became very lightheaded and unsteady on her feet.  She felt as though she was going to fall over.  Her symptoms subsided when resting. She has no recurrence of focal unilateral symptoms.  No truncal ataxia while sitting and no dysmetria present to indicate clear central cause. However, given her overall presenting symptoms, this is not excluded and she may benefit from MRI as well as echo. I engaged in shared decision making with the patient and her daughter and will admit for further management.   [LM]   0238 XR chest 1 view [LM]      ED Course User Index  [JG] Gloria Sahni MD  [LM] Nieves Cordoba MD         Diagnoses as of 11/10/24 0427   Lightheadedness   Cerumen in auditory canal on examination                 No data recorded     [place] Coma Scale  "Score: 15 (11/08/24 1000 : Susan Sanchez RN)                           Medical Decision Making  63-year-old female with history of hypertension reports lightheadedness and an odd sensation in her right upper extremity for the past week with hot and cold flashes.  Describes the right upper extremity symptoms as \"fuzziness \"in tingling, denies numbness or weakness. States she is having some urinary symptoms but denies dysuria and is unable to further characterize urinary symptoms.  On examination, patient is A&O4, no focal neurologic deficits.  Moving all extremities equally.  NIH is 0.  Patient does have intact 2 point discrimination in her right upper extremity with 2+ radial pulses and full range of motion.  No midline CT LS tenderness or reported neck pain to suggest cervicalgia.  Abdomen is soft and nontender, no suprapubic tenderness.  Broad workup initiated to rule out electrolyte derangement, infectious etiology, anemia, thyroid derangement, ACS, CVA.  CTA head and neck ordered and pending. Patient signed out to oncoming provider pending remainder of workup and disposition.        Procedure  Procedures     Gloria Sahni MD  11/07/24 0019       Gloira Sahni MD  11/07/24 0246       Gloria Sahni MD  11/10/24 9469    "

## 2024-11-08 VITALS
SYSTOLIC BLOOD PRESSURE: 122 MMHG | WEIGHT: 145 LBS | HEART RATE: 83 BPM | OXYGEN SATURATION: 100 % | RESPIRATION RATE: 20 BRPM | BODY MASS INDEX: 26.68 KG/M2 | TEMPERATURE: 96.8 F | HEIGHT: 62 IN | DIASTOLIC BLOOD PRESSURE: 69 MMHG

## 2024-11-08 LAB
ANION GAP SERPL CALC-SCNC: 10 MMOL/L (ref 10–20)
BUN SERPL-MCNC: 15 MG/DL (ref 6–23)
CALCIUM SERPL-MCNC: 8.9 MG/DL (ref 8.6–10.3)
CHLORIDE SERPL-SCNC: 109 MMOL/L (ref 98–107)
CO2 SERPL-SCNC: 27 MMOL/L (ref 21–32)
CREAT SERPL-MCNC: 0.57 MG/DL (ref 0.5–1.05)
EGFRCR SERPLBLD CKD-EPI 2021: >90 ML/MIN/1.73M*2
ERYTHROCYTE [DISTWIDTH] IN BLOOD BY AUTOMATED COUNT: 12.9 % (ref 11.5–14.5)
GLUCOSE SERPL-MCNC: 101 MG/DL (ref 74–99)
HCT VFR BLD AUTO: 44.6 % (ref 36–46)
HGB BLD-MCNC: 13.5 G/DL (ref 12–16)
MCH RBC QN AUTO: 29.4 PG (ref 26–34)
MCHC RBC AUTO-ENTMCNC: 30.3 G/DL (ref 32–36)
MCV RBC AUTO: 97 FL (ref 80–100)
NRBC BLD-RTO: 0 /100 WBCS (ref 0–0)
PLATELET # BLD AUTO: 251 X10*3/UL (ref 150–450)
POTASSIUM SERPL-SCNC: 4.5 MMOL/L (ref 3.5–5.3)
RBC # BLD AUTO: 4.59 X10*6/UL (ref 4–5.2)
SODIUM SERPL-SCNC: 141 MMOL/L (ref 136–145)
WBC # BLD AUTO: 4.6 X10*3/UL (ref 4.4–11.3)

## 2024-11-08 PROCEDURE — 2500000004 HC RX 250 GENERAL PHARMACY W/ HCPCS (ALT 636 FOR OP/ED): Performed by: NURSE PRACTITIONER

## 2024-11-08 PROCEDURE — 99238 HOSP IP/OBS DSCHRG MGMT 30/<: CPT | Performed by: NURSE PRACTITIONER

## 2024-11-08 PROCEDURE — 96372 THER/PROPH/DIAG INJ SC/IM: CPT | Performed by: NURSE PRACTITIONER

## 2024-11-08 PROCEDURE — 85027 COMPLETE CBC AUTOMATED: CPT

## 2024-11-08 PROCEDURE — 36415 COLL VENOUS BLD VENIPUNCTURE: CPT

## 2024-11-08 PROCEDURE — G0378 HOSPITAL OBSERVATION PER HR: HCPCS

## 2024-11-08 PROCEDURE — 80048 BASIC METABOLIC PNL TOTAL CA: CPT

## 2024-11-08 RX ORDER — MECLIZINE HYDROCHLORIDE 25 MG/1
25 TABLET ORAL 3 TIMES DAILY PRN
Qty: 30 TABLET | Refills: 0 | Status: SHIPPED | OUTPATIENT
Start: 2024-11-08

## 2024-11-08 ASSESSMENT — COGNITIVE AND FUNCTIONAL STATUS - GENERAL
DAILY ACTIVITIY SCORE: 24
MOBILITY SCORE: 24

## 2024-11-08 NOTE — ED NOTES
Community Resource Name: No  primary care physician.  Phone Number:   Staff Member:  Rianna Marshall    Discussed the following topics on behalf of the patient:  []  Behavioral Health Assistance     []  Case Management  []   Assistance  []  Digital Equity Assistance  []  Dental Health Assistance  []  Education Assistance  []  Employment Assistance  []  Financial Strain Relief Assistance  []  Food Insecurity Assistance  [x]  Healthcare Coverage Assistance  []  Housing Stability Assistance  []  IP Violence Relief Assistance  []  Legal Assistance  []  Physical Activity Assistance  []  Social Connection Assistance  []  Stress Relief Assistance   []  Substance Abuse Assistance  []  Transportation Assistance  []  Utility Assistance  [x]  Other: [insert comment here]  Patient does have a primary care physician. CHW updated the patient chart.  Next Steps:         NIKHIL Rivera   CHW talked to patient regarding not having a primary care physician. Patient expressed that she does have a primary care physician from [facility] named Dr. Jesús Rosales and [facility]. CHW updated the patient chart with the physician name added. Patient expressed appreciation.      NIKHIL Rivera  11/08/24 4752     Estimated Blood Loss (Cc): minimal

## 2024-11-08 NOTE — SIGNIFICANT EVENT
Imaging reviewed.    Carotid ultrasound shows < 50% bilateral ICA stenosis. Antegrade flow in both vertebral arteries. No subclavian stenosis.    Brain MRI shows negative diffusion. Moderate burden of nonspecific FLAIR white matter hyperintensities.    Recommendations as per yesterday's note. Will sign off; reconsult as warranted.

## 2024-11-08 NOTE — DISCHARGE SUMMARY
Discharge Diagnosis  Lightheadedness    Issues Requiring Follow-Up  PCP    Discharge Meds     Medication List      START taking these medications     meclizine 25 mg tablet; Commonly known as: Antivert; Take 1 tablet (25   mg) by mouth 3 times a day as needed for dizziness.     CONTINUE taking these medications     amLODIPine 2.5 mg tablet; Commonly known as: Norvasc       Test Results Pending At Discharge  Pending Labs       Order Current Status    Extra Urine Gray Tube Collected (11/06/24 8076)    Urinalysis with Reflex Culture and Microscopic In process            Hospital Course  Nancy Ramírez is a 63 y.o. female presenting with lightheadedness and RUE paraesthesias that have been ongoing for about 2 weeks. She best describes the lightheadedness and dizziness as feeling like the room is spinning. She endorses these symptoms worsen when she stands up and moves around, also with bending over. She reports these symptoms have been intermittent over the past 2 weeks. She also endorses OCHOA, especially with walking up stairs and walking distances to where she has to stop and catch her breath. She endorses the RUE paresthesias as a throbbing numbness that started along the same time as the dizziness.     She was admitted to observation for further management.  While here, she did have transient episode of right sided weakness which resolved.  Neurology did see an MRI was completed which was negative.  Ultrasound of the carotids did show less than 50% stenosis.  She did respond well to meclizine and this morning was walking the halls without difficulty, she endorses feeling much better.  Given she responded well with meclizine, could be vertigo.  Will trial meclizine at discharge.  Instructed patient, that if this continues she should follow-up with PCP for further referrals and potential tilt table test.  Regarding her dyspnea on exertion, could be related to emphysema.  Echo was completed as part of neurowork-up but  also was unrevealing and not likely contributing to shortness of breath.  She endorses feeling much better and is stable for discharge with follow-up with PCP.    Labs/Testing reviewed    Interdisciplinary team rounding completed with hospitalist, nurse, TCC    NP discussed plan and lab/testing results with Dr. Ivan        Pertinent Physical Exam At Time of Discharge  Physical Exam    Outpatient Follow-Up  No future appointments.      Venus Bender, APRN-CNP

## 2024-11-08 NOTE — CARE PLAN
Problem: Pain - Adult  Goal: Verbalizes/displays adequate comfort level or baseline comfort level  Outcome: Progressing     Problem: Safety - Adult  Goal: Free from fall injury  Outcome: Progressing     Problem: Discharge Planning  Goal: Discharge to home or other facility with appropriate resources  Outcome: Progressing     Problem: Chronic Conditions and Co-morbidities  Goal: Patient's chronic conditions and co-morbidity symptoms are monitored and maintained or improved  Outcome: Progressing     Problem: Fall/Injury  Goal: Not fall by end of shift  Outcome: Progressing  Goal: Be free from injury by end of the shift  Outcome: Progressing  Goal: Verbalize understanding of personal risk factors for fall in the hospital  Outcome: Progressing  Goal: Verbalize understanding of risk factor reduction measures to prevent injury from fall in the home  Outcome: Progressing  Goal: Use assistive devices by end of the shift  Outcome: Progressing  Goal: Pace activities to prevent fatigue by end of the shift  Outcome: Progressing     Problem: Pain  Goal: Takes deep breaths with improved pain control throughout the shift  Outcome: Progressing  Goal: Turns in bed with improved pain control throughout the shift  Outcome: Progressing  Goal: Walks with improved pain control throughout the shift  Outcome: Progressing  Goal: Performs ADL's with improved pain control throughout shift  Outcome: Progressing  Goal: Participates in PT with improved pain control throughout the shift  Outcome: Progressing  Goal: Free from opioid side effects throughout the shift  Outcome: Progressing  Goal: Free from acute confusion related to pain meds throughout the shift  Outcome: Progressing   The patient's goals for the shift include      The clinical goals for the shift include pt to remain dizziness free    Over the shift, the patient did not make progress toward the following goals. Barriers to progression include disease process. Recommendations to  address these barriers include hourly roundings.

## 2024-11-08 NOTE — PROGRESS NOTES
11/08/24 1256   Discharge Planning   Home or Post Acute Services None   Expected Discharge Disposition Home     Patient is medically ready for discharge  They are being discharged to: HOME  ___FAMILY_______ will pick patient up      No home going needs identified at this time.

## 2024-11-08 NOTE — DISCHARGE INSTRUCTIONS
[facility] Observation Unit Discharge Instructions  You came to the hospital with dizziness and were admitted for observation and further care.   You were treated with a CT. MRI of the head, and US carotids which was negative for any acute pathology.   A neurology specialist saw you while admitted and helped manage your care. You responded well to the meclizine which questions if this could be vertigo, you are being sent home with script for meclizine. If this persists you can follow with your PCP and discuss further neuro testing, such as a tilt table test.   Your discharge plan is to go home to recover.    Please see your primary care doctor in 1 week for follow-up.   An appointment has been requested for you but may you need to call your doctors office to schedule.      For any issues or concerns with appointments, call the  scheduling line at [phone] or the providers office directly.       See the attached information for education about any new medications and the condition(s) you were treated for.  Your medications may have changed so pay close attention to the list given to you at discharge and ask any questions you have before leaving the hospital.

## 2024-11-15 LAB
ATRIAL RATE: 71 BPM
P AXIS: 78 DEGREES
P OFFSET: 194 MS
P ONSET: 142 MS
PR INTERVAL: 160 MS
Q ONSET: 222 MS
QRS COUNT: 12 BEATS
QRS DURATION: 86 MS
QT INTERVAL: 404 MS
QTC CALCULATION(BAZETT): 439 MS
QTC FREDERICIA: 427 MS
R AXIS: 57 DEGREES
T AXIS: 58 DEGREES
T OFFSET: 424 MS
VENTRICULAR RATE: 71 BPM

## 2025-05-02 ENCOUNTER — HOSPITAL ENCOUNTER (EMERGENCY)
Facility: HOSPITAL | Age: 65
Discharge: HOME | End: 2025-05-02
Attending: EMERGENCY MEDICINE
Payer: COMMERCIAL

## 2025-05-02 ENCOUNTER — APPOINTMENT (OUTPATIENT)
Dept: CARDIOLOGY | Facility: HOSPITAL | Age: 65
End: 2025-05-02
Payer: COMMERCIAL

## 2025-05-02 VITALS
DIASTOLIC BLOOD PRESSURE: 89 MMHG | RESPIRATION RATE: 16 BRPM | HEART RATE: 79 BPM | SYSTOLIC BLOOD PRESSURE: 129 MMHG | OXYGEN SATURATION: 99 % | TEMPERATURE: 98.2 F

## 2025-05-02 DIAGNOSIS — R42 VERTIGO: Primary | ICD-10-CM

## 2025-05-02 LAB
ALBUMIN SERPL BCP-MCNC: 4.4 G/DL (ref 3.4–5)
ALP SERPL-CCNC: 80 U/L (ref 33–136)
ALT SERPL W P-5'-P-CCNC: 13 U/L (ref 7–45)
ANION GAP SERPL CALC-SCNC: 11 MMOL/L (ref 10–20)
APTT PPP: 36 SECONDS (ref 26–36)
AST SERPL W P-5'-P-CCNC: 18 U/L (ref 9–39)
ATRIAL RATE: 71 BPM
BASOPHILS # BLD AUTO: 0.03 X10*3/UL (ref 0–0.1)
BASOPHILS NFR BLD AUTO: 0.4 %
BILIRUB SERPL-MCNC: 0.5 MG/DL (ref 0–1.2)
BUN SERPL-MCNC: 14 MG/DL (ref 6–23)
CALCIUM SERPL-MCNC: 9.2 MG/DL (ref 8.6–10.3)
CARDIAC TROPONIN I PNL SERPL HS: 4 NG/L (ref 0–13)
CHLORIDE SERPL-SCNC: 106 MMOL/L (ref 98–107)
CO2 SERPL-SCNC: 27 MMOL/L (ref 21–32)
CREAT SERPL-MCNC: 0.63 MG/DL (ref 0.5–1.05)
EGFRCR SERPLBLD CKD-EPI 2021: >90 ML/MIN/1.73M*2
EOSINOPHIL # BLD AUTO: 0.09 X10*3/UL (ref 0–0.7)
EOSINOPHIL NFR BLD AUTO: 1.1 %
ERYTHROCYTE [DISTWIDTH] IN BLOOD BY AUTOMATED COUNT: 12.7 % (ref 11.5–14.5)
GLUCOSE SERPL-MCNC: 126 MG/DL (ref 74–99)
HCT VFR BLD AUTO: 41.3 % (ref 36–46)
HGB BLD-MCNC: 13.4 G/DL (ref 12–16)
IMM GRANULOCYTES # BLD AUTO: 0.02 X10*3/UL (ref 0–0.7)
IMM GRANULOCYTES NFR BLD AUTO: 0.3 % (ref 0–0.9)
INR PPP: 1 (ref 0.9–1.1)
LYMPHOCYTES # BLD AUTO: 2.3 X10*3/UL (ref 1.2–4.8)
LYMPHOCYTES NFR BLD AUTO: 29.2 %
MCH RBC QN AUTO: 28.5 PG (ref 26–34)
MCHC RBC AUTO-ENTMCNC: 32.4 G/DL (ref 32–36)
MCV RBC AUTO: 88 FL (ref 80–100)
MONOCYTES # BLD AUTO: 0.47 X10*3/UL (ref 0.1–1)
MONOCYTES NFR BLD AUTO: 6 %
NEUTROPHILS # BLD AUTO: 4.98 X10*3/UL (ref 1.2–7.7)
NEUTROPHILS NFR BLD AUTO: 63 %
NRBC BLD-RTO: 0 /100 WBCS (ref 0–0)
P AXIS: 76 DEGREES
P OFFSET: 196 MS
P ONSET: 143 MS
PLATELET # BLD AUTO: 313 X10*3/UL (ref 150–450)
POTASSIUM SERPL-SCNC: 3.7 MMOL/L (ref 3.5–5.3)
PR INTERVAL: 160 MS
PROT SERPL-MCNC: 7.9 G/DL (ref 6.4–8.2)
PROTHROMBIN TIME: 11.4 SECONDS (ref 9.8–12.4)
Q ONSET: 223 MS
QRS COUNT: 12 BEATS
QRS DURATION: 76 MS
QT INTERVAL: 392 MS
QTC CALCULATION(BAZETT): 425 MS
QTC FREDERICIA: 414 MS
R AXIS: 43 DEGREES
RBC # BLD AUTO: 4.7 X10*6/UL (ref 4–5.2)
SODIUM SERPL-SCNC: 140 MMOL/L (ref 136–145)
T AXIS: 58 DEGREES
T OFFSET: 419 MS
VENTRICULAR RATE: 71 BPM
WBC # BLD AUTO: 7.9 X10*3/UL (ref 4.4–11.3)

## 2025-05-02 PROCEDURE — 36415 COLL VENOUS BLD VENIPUNCTURE: CPT

## 2025-05-02 PROCEDURE — 84075 ASSAY ALKALINE PHOSPHATASE: CPT

## 2025-05-02 PROCEDURE — 99284 EMERGENCY DEPT VISIT MOD MDM: CPT | Performed by: EMERGENCY MEDICINE

## 2025-05-02 PROCEDURE — 85610 PROTHROMBIN TIME: CPT

## 2025-05-02 PROCEDURE — 85025 COMPLETE CBC W/AUTO DIFF WBC: CPT

## 2025-05-02 PROCEDURE — 84484 ASSAY OF TROPONIN QUANT: CPT

## 2025-05-02 PROCEDURE — 93005 ELECTROCARDIOGRAM TRACING: CPT

## 2025-05-02 RX ORDER — MECLIZINE HYDROCHLORIDE 25 MG/1
25 TABLET ORAL 3 TIMES DAILY PRN
Qty: 30 TABLET | Refills: 0 | Status: SHIPPED | OUTPATIENT
Start: 2025-05-02 | End: 2025-05-12

## 2025-05-02 RX ORDER — MECLIZINE HYDROCHLORIDE 25 MG/1
25 TABLET ORAL 3 TIMES DAILY PRN
Qty: 30 TABLET | Refills: 0 | Status: SHIPPED | OUTPATIENT
Start: 2025-05-02 | End: 2025-05-02

## 2025-05-02 ASSESSMENT — COLUMBIA-SUICIDE SEVERITY RATING SCALE - C-SSRS
6. HAVE YOU EVER DONE ANYTHING, STARTED TO DO ANYTHING, OR PREPARED TO DO ANYTHING TO END YOUR LIFE?: NO
2. HAVE YOU ACTUALLY HAD ANY THOUGHTS OF KILLING YOURSELF?: NO
1. IN THE PAST MONTH, HAVE YOU WISHED YOU WERE DEAD OR WISHED YOU COULD GO TO SLEEP AND NOT WAKE UP?: NO

## 2025-05-02 ASSESSMENT — PAIN - FUNCTIONAL ASSESSMENT: PAIN_FUNCTIONAL_ASSESSMENT: 0-10

## 2025-05-02 ASSESSMENT — PAIN SCALES - GENERAL: PAINLEVEL_OUTOF10: 5 - MODERATE PAIN

## 2025-05-02 ASSESSMENT — PAIN DESCRIPTION - LOCATION: LOCATION: HEAD

## 2025-05-02 NOTE — ED TRIAGE NOTES
As provider-in-triage, I performed a medical screening history and physical exam on this patient.  HISTORY OF PRESENT ILLNESS  Patient is a 64-year-old female presenting to ED with concern for dizziness.  Patient states started this morning.  It is a constant room spinning sensation.  Patient states it is improved since initially this morning.  Does not worse with head movement.  She denies any numbness, weakness, tingling, headache, syncope, vision changes.  Patient states that standing laboratory couple months ago.     PHYSICAL EXAM  Vital Signs reviewed.  Cardiovascular: Regular rate and rhythm. No m/g/r  Respiratory: No respiratory distress. No accessory muscle use. Breath sounds are present and equal b/l. No adventitious sounds.   Neurologic: Van negative.  NIHSS 0.  Visual fields full to confrontation.  Negative test of skew.  Patient was ambulated for 3 steps in triage room with no truncal instability.       MDM  Workup initiated. Pt stable pending bed availability and further evaluation. Please see subsequent provider note for further details and disposition.         I evaluated this patient in triage with the RN. Due to the patients complaint labs and or imaging were ordered by myself in an attempt to expedite patient care however I am not participating in care after evaluation. This is a preliminary assessment. Pt does not appear in acute distress at this time. They will have a full evaluation as soon as possible. They will be cared for by another provider who will possibly order more labs, imaging or interventions. Pt did not have a full ROS or PE completed by myself however above is a summary with reasons for orders.  For the remainder of the patient's workup and ED course, please refer to the main ED provider note. We discussed need for diagnostic testing including laboratory studies and imaging.  We also discussed that patient may be asked to wait in the waiting room while these tests are pending.   They understand that if they choose to leave without having the testing completed or resulted that we cannot rule out acute life threatening illnesses and the risks involved could lead to worsening condition, permanent disability or even death.

## 2025-05-02 NOTE — ED TRIAGE NOTES
Patient to ED for c/o dizziness. Patient reports the room spinning and feeling hot. Patient reports taking blood pressure and it was 153/85. Patient also reporting a mild HA and weakness. Patient denies numbness, tingling and CP.

## 2025-05-02 NOTE — DISCHARGE INSTRUCTIONS
You had blood work that was reassuring today.  If you have vertigo, take the meclizine.  If that does not work, you can take a second dose.  If it is persistent, please come to the emergency department for further evaluation.

## 2025-05-02 NOTE — ED PROVIDER NOTES
Emergency Department Provider Note       History of Present Illness     History provided by: Patient  Limitations to History: None  External Records Reviewed with Brief Summary: Discharge Summary from 2024 which showed she was admitted for vertigo, had negative MRI/MRA for stroke, discharged on meclizine.     HPI:  Nancy Ramírez is a 64 y.o. female who is presenting with feeling like the room is spinning.  This started upon waking up, felt like the room would not stay still.  Denies headache, neck pain, numbness, tingling, weakness, changes in sensation, difficulty swallowing, change in speech.  Denies chest pain, fever, shortness of breath, abdominal pain, dysuria.  This feels like when she was admitted in November and had negative MRIs for strokes.  She stated that she took a very old meclizine and it made her nauseous and she threw it up.  Upon provider assessment, she is having no dizziness and says that it has resolved.    Physical Exam   Triage vitals:  T 36.8 °C (98.2 °F)  HR 72  /88  RR 18  O2 97 % None (Room air)    General: Awake, alert, in no acute distress  Eyes: Gaze conjugate.  No scleral icterus or injection  HENT: Normo-cephalic, atraumatic. No stridor  CV: Regular rate, regular rhythm. Radial pulses 2+ bilaterally  Resp: Breathing non-labored, speaking in full sentences.  Clear to auscultation bilaterally  GI: Soft, non-distended, non-tender. No rebound or guarding.  MSK/Extremities: No gross bony deformities. Moving all extremities, full range of motion of the neck  Skin: Warm. Appropriate color  Neuro: Alert. Oriented. Face symmetric. Speech is fluent.  Gross strength and sensation intact in b/l UE and LEs.  Cranial nerves are intact, intact finger-to-nose, intact gait, no nystagmus, negative test of skew, reflexes normal, NIH 0  Psych: Appropriate mood and affect      Medical Decision Making & ED Course   Medical Decision Makin y.o. female p/w vertigo, now resolved.  Low suspicion for stroke given prior admission for similar symptoms with negative MRIs.  Her cerebellar function is intact.  She had an EKG and labs obtained in triage, troponin is negative and I have low suspicion for acute coronary syndrome.  EKG has normal intervals, lower suspicion for arrhythmia.  She was monitored in the room on telemetry and did not exhibit any arrhythmias.  Patient metabolic panel is reassuring without evidence of hypo or hypernatremia or acute kidney injury.  She is observed in the ER for 2 hours without recurrence of her symptoms.  I discharged her with a new prescription of meclizine, had a full discussion about how I think her symptoms are from peripheral vertigo.  She understands, will follow-up with primary care and return for any new or concerning symptoms.  ----      EKG Independent Interpretation: EKG interpreted by myself. Please see ED Course for full interpretation.      ED Course:  ED Course as of 05/04/25 1041   Sun May 04, 2025   1039 EKG normal sinus rhythm heart rate 71, no ST segment elevations or depressions, no T wave inversions, QTc 425 [AM]      ED Course User Index  [AM] Cezar Patel MD         Diagnoses as of 05/04/25 1041   Vertigo       Disposition   As a result of the work-up, the patient was discharged home.  she was informed of her diagnosis and instructed to come back with any concerns or worsening of condition.  she and was agreeable to the plan as discussed above.  she was given the opportunity to ask questions.  All of the patient's questions were answered.    Procedures   Procedures    Patient was seen independently    Cezar Patel MD  Emergency Medicine                                                            Cezar Patel MD  05/04/25 1041

## 2025-06-10 ENCOUNTER — APPOINTMENT (OUTPATIENT)
Dept: AUDIOLOGY | Facility: CLINIC | Age: 65
End: 2025-06-10
Payer: COMMERCIAL

## 2025-06-10 ENCOUNTER — APPOINTMENT (OUTPATIENT)
Dept: OTOLARYNGOLOGY | Facility: CLINIC | Age: 65
End: 2025-06-10
Payer: COMMERCIAL